# Patient Record
Sex: MALE | Race: BLACK OR AFRICAN AMERICAN | NOT HISPANIC OR LATINO | URBAN - METROPOLITAN AREA
[De-identification: names, ages, dates, MRNs, and addresses within clinical notes are randomized per-mention and may not be internally consistent; named-entity substitution may affect disease eponyms.]

---

## 2021-09-01 ENCOUNTER — OFFICE VISIT (OUTPATIENT)
Dept: URBAN - METROPOLITAN AREA CLINIC 78 | Facility: CLINIC | Age: 36
End: 2021-09-01
Payer: COMMERCIAL

## 2021-09-01 ENCOUNTER — LAB OUTSIDE AN ENCOUNTER (OUTPATIENT)
Dept: URBAN - METROPOLITAN AREA CLINIC 78 | Facility: CLINIC | Age: 36
End: 2021-09-01

## 2021-09-01 ENCOUNTER — DASHBOARD ENCOUNTERS (OUTPATIENT)
Age: 36
End: 2021-09-01

## 2021-09-01 VITALS
SYSTOLIC BLOOD PRESSURE: 141 MMHG | TEMPERATURE: 98 F | DIASTOLIC BLOOD PRESSURE: 96 MMHG | HEART RATE: 91 BPM | WEIGHT: 213.4 LBS | HEIGHT: 72 IN | BODY MASS INDEX: 28.91 KG/M2

## 2021-09-01 DIAGNOSIS — K92.1 BLOOD IN STOOL: ICD-10-CM

## 2021-09-01 DIAGNOSIS — K59.09 CHRONIC CONSTIPATION: ICD-10-CM

## 2021-09-01 PROCEDURE — G8427 DOCREV CUR MEDS BY ELIG CLIN: HCPCS | Performed by: INTERNAL MEDICINE

## 2021-09-01 PROCEDURE — G9903 PT SCRN TBCO ID AS NON USER: HCPCS | Performed by: INTERNAL MEDICINE

## 2021-09-01 PROCEDURE — G9622 NO UNHEAL ETOH USER: HCPCS | Performed by: INTERNAL MEDICINE

## 2021-09-01 PROCEDURE — 1036F TOBACCO NON-USER: CPT | Performed by: INTERNAL MEDICINE

## 2021-09-01 PROCEDURE — G8420 CALC BMI NORM PARAMETERS: HCPCS | Performed by: INTERNAL MEDICINE

## 2021-09-01 PROCEDURE — 99243 OFF/OP CNSLTJ NEW/EST LOW 30: CPT | Performed by: INTERNAL MEDICINE

## 2021-09-01 PROCEDURE — 3017F COLORECTAL CA SCREEN DOC REV: CPT | Performed by: INTERNAL MEDICINE

## 2021-09-01 RX ORDER — STANDARDIZED SENNA CONCENTRATE 8.6 MG/1
2 TABLETS AT BEDTIME AS NEEDED TABLET ORAL ONCE A DAY
Status: ACTIVE | COMMUNITY

## 2021-09-01 RX ORDER — TELMISARTAN 40 MG/1
1 TABLET TABLET ORAL ONCE A DAY
Status: ACTIVE | COMMUNITY

## 2021-09-01 RX ORDER — SODIUM PICOSULFATE, MAGNESIUM OXIDE, AND ANHYDROUS CITRIC ACID 10; 3.5; 12 MG/160ML; G/160ML; G/160ML
160ML LIQUID ORAL AS DIRECTED
Qty: 320 ML | Refills: 0 | OUTPATIENT
Start: 2021-09-01 | End: 2021-09-02

## 2021-09-01 NOTE — HPI-TODAY'S VISIT:
This patient was referred by Dr. Jenny Jackson for evaluation of blood in stool. The copy of this note will be sent to the referring provider. 35-year-old -American male presents for 2-3 years history of intermittent blood in stool.  6 episodes last year.  The last episode was few days ago.  It can be rarely can be black stool.  Constipated.  3-4 days of bowel movements per week.  He takes MiraLAX and Senokot as needed. Annual checkup overall normal, he did not hear he has anemia.  Denies nausea vomiting abdominal pain.  No weight loss.  No family history of colorectal cancer.  No IBD in his family.

## 2021-09-23 ENCOUNTER — OFFICE VISIT (OUTPATIENT)
Dept: URBAN - METROPOLITAN AREA SURGERY CENTER 15 | Facility: SURGERY CENTER | Age: 36
End: 2021-09-23
Payer: COMMERCIAL

## 2021-09-23 DIAGNOSIS — K92.1 ACUTE MELENA: ICD-10-CM

## 2021-09-23 PROCEDURE — 45378 DIAGNOSTIC COLONOSCOPY: CPT | Performed by: INTERNAL MEDICINE

## 2021-09-23 PROCEDURE — G8907 PT DOC NO EVENTS ON DISCHARG: HCPCS | Performed by: INTERNAL MEDICINE

## 2021-09-23 RX ORDER — STANDARDIZED SENNA CONCENTRATE 8.6 MG/1
2 TABLETS AT BEDTIME AS NEEDED TABLET ORAL ONCE A DAY
Status: ACTIVE | COMMUNITY

## 2021-09-23 RX ORDER — TELMISARTAN 40 MG/1
1 TABLET TABLET ORAL ONCE A DAY
Status: ACTIVE | COMMUNITY